# Patient Record
Sex: MALE | Race: WHITE | NOT HISPANIC OR LATINO | URBAN - METROPOLITAN AREA
[De-identification: names, ages, dates, MRNs, and addresses within clinical notes are randomized per-mention and may not be internally consistent; named-entity substitution may affect disease eponyms.]

---

## 2017-06-10 ENCOUNTER — EMERGENCY (EMERGENCY)
Facility: HOSPITAL | Age: 47
LOS: 1 days | Discharge: HOME | End: 2017-06-10
Attending: INTERNAL MEDICINE | Admitting: INTERNAL MEDICINE
Payer: COMMERCIAL

## 2017-06-10 VITALS
OXYGEN SATURATION: 100 % | WEIGHT: 184.97 LBS | RESPIRATION RATE: 16 BRPM | TEMPERATURE: 98 F | HEIGHT: 67 IN | HEART RATE: 106 BPM | DIASTOLIC BLOOD PRESSURE: 70 MMHG | SYSTOLIC BLOOD PRESSURE: 112 MMHG

## 2017-06-10 VITALS
DIASTOLIC BLOOD PRESSURE: 58 MMHG | HEART RATE: 77 BPM | OXYGEN SATURATION: 97 % | SYSTOLIC BLOOD PRESSURE: 117 MMHG | RESPIRATION RATE: 15 BRPM | TEMPERATURE: 98 F

## 2017-06-10 DIAGNOSIS — S61.422A LACERATION WITH FOREIGN BODY OF LEFT HAND, INITIAL ENCOUNTER: ICD-10-CM
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
## 2017-06-10 DIAGNOSIS — I10 ESSENTIAL (PRIMARY) HYPERTENSION: ICD-10-CM
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
## 2017-06-10 PROCEDURE — 90715 TDAP VACCINE 7 YRS/> IM: CPT

## 2017-06-10 PROCEDURE — 10120 INC&RMVL FB SUBQ TISS SMPL: CPT

## 2017-06-10 PROCEDURE — 90471 IMMUNIZATION ADMIN: CPT

## 2017-06-10 PROCEDURE — 12001 RPR S/N/AX/GEN/TRNK 2.5CM/<: CPT | Mod: 59

## 2017-06-10 PROCEDURE — 99285 EMERGENCY DEPT VISIT HI MDM: CPT | Mod: 25

## 2017-06-10 PROCEDURE — 73120 X-RAY EXAM OF HAND: CPT

## 2017-06-10 PROCEDURE — 99283 EMERGENCY DEPT VISIT LOW MDM: CPT | Mod: 25

## 2017-06-10 PROCEDURE — 73120 X-RAY EXAM OF HAND: CPT | Mod: 26,LT

## 2017-06-10 RX ORDER — TETANUS TOXOID, REDUCED DIPHTHERIA TOXOID AND ACELLULAR PERTUSSIS VACCINE, ADSORBED 5; 2.5; 8; 8; 2.5 [IU]/.5ML; [IU]/.5ML; UG/.5ML; UG/.5ML; UG/.5ML
0.5 SUSPENSION INTRAMUSCULAR ONCE
Qty: 0 | Refills: 0 | Status: COMPLETED | OUTPATIENT
Start: 2017-06-10 | End: 2017-06-10

## 2017-06-10 RX ADMIN — TETANUS TOXOID, REDUCED DIPHTHERIA TOXOID AND ACELLULAR PERTUSSIS VACCINE, ADSORBED 0.5 MILLILITER(S): 5; 2.5; 8; 8; 2.5 SUSPENSION INTRAMUSCULAR at 01:35

## 2017-06-10 RX ADMIN — Medication 1 TABLET(S): at 01:35

## 2017-06-10 NOTE — ED PROVIDER NOTE - NS ED ROS FT
Left palmar hand:  embedded splinter in the palm below his 5th finger that enters the base of the fourth finger, NVE intact. Wood is visible at the 3-4th space and palpable below the 5th finger

## 2017-06-10 NOTE — ED PROCEDURE NOTE - GENERAL PROCEDURE DETAILS
local anesthesia, small incision directly over the FB, removal with forceps, wound closed with 5-0 nylon x 4 stitches

## 2019-12-27 PROBLEM — I10 ESSENTIAL (PRIMARY) HYPERTENSION: Chronic | Status: ACTIVE | Noted: 2017-06-10

## 2019-12-30 PROBLEM — Z00.00 ENCOUNTER FOR PREVENTIVE HEALTH EXAMINATION: Status: ACTIVE | Noted: 2019-12-30

## 2020-01-02 ENCOUNTER — APPOINTMENT (OUTPATIENT)
Dept: COLORECTAL SURGERY | Facility: CLINIC | Age: 50
End: 2020-01-02

## 2020-01-07 ENCOUNTER — APPOINTMENT (OUTPATIENT)
Dept: COLORECTAL SURGERY | Facility: CLINIC | Age: 50
End: 2020-01-07
Payer: COMMERCIAL

## 2020-01-07 VITALS
DIASTOLIC BLOOD PRESSURE: 78 MMHG | TEMPERATURE: 98.8 F | BODY MASS INDEX: 32.39 KG/M2 | WEIGHT: 165 LBS | HEIGHT: 60 IN | HEART RATE: 69 BPM | RESPIRATION RATE: 16 BRPM | SYSTOLIC BLOOD PRESSURE: 110 MMHG

## 2020-01-07 DIAGNOSIS — Z86.79 PERSONAL HISTORY OF OTHER DISEASES OF THE CIRCULATORY SYSTEM: ICD-10-CM

## 2020-01-07 DIAGNOSIS — F17.200 NICOTINE DEPENDENCE, UNSPECIFIED, UNCOMPLICATED: ICD-10-CM

## 2020-01-07 DIAGNOSIS — K50.913 CROHN'S DISEASE, UNSPECIFIED, WITH FISTULA: ICD-10-CM

## 2020-01-07 DIAGNOSIS — K50.90 CROHN'S DISEASE, UNSPECIFIED, W/OUT COMPLICATIONS: ICD-10-CM

## 2020-01-07 PROCEDURE — 99213 OFFICE O/P EST LOW 20 MIN: CPT

## 2020-01-07 RX ORDER — METRONIDAZOLE 500 MG/1
TABLET, FILM COATED ORAL
Refills: 0 | Status: ACTIVE | COMMUNITY

## 2020-01-07 RX ORDER — MESALAMINE 1.2 G/1
TABLET, DELAYED RELEASE ORAL
Refills: 0 | Status: ACTIVE | COMMUNITY

## 2020-01-07 RX ORDER — LEVOFLOXACIN 750 MG/1
TABLET, FILM COATED ORAL
Refills: 0 | Status: ACTIVE | COMMUNITY

## 2020-03-10 ENCOUNTER — APPOINTMENT (OUTPATIENT)
Dept: COLORECTAL SURGERY | Facility: CLINIC | Age: 50
End: 2020-03-10
Payer: COMMERCIAL

## 2020-03-10 VITALS
WEIGHT: 164 LBS | HEIGHT: 60 IN | SYSTOLIC BLOOD PRESSURE: 120 MMHG | BODY MASS INDEX: 32.2 KG/M2 | DIASTOLIC BLOOD PRESSURE: 70 MMHG | TEMPERATURE: 98 F

## 2020-03-10 PROCEDURE — 99214 OFFICE O/P EST MOD 30 MIN: CPT

## 2020-03-17 PROBLEM — K50.913 CROHN'S DISEASE WITH FISTULA, UNSPECIFIED GASTROINTESTINAL TRACT LOCATION: Status: ACTIVE | Noted: 2020-03-17

## 2020-03-17 PROBLEM — K50.90 CROHN'S DISEASE: Status: ACTIVE | Noted: 2020-01-07

## 2020-03-17 NOTE — PHYSICAL EXAM
[Fistula] : a fistula [Tender, Swollen] : tender, swollen [Skin Tags] : residual hemorrhoidal skin tags were noted [Respiratory Effort] : normal respiratory effort [Normal Rate and Rhythm] : normal rate and rhythm [No Rash or Lesion] : No rash or lesion [Alert] : alert [Oriented to Person] : oriented to person [Oriented to Place] : oriented to place [Calm] : calm [Abdomen Masses] : No abdominal masses [Tender] : nontender [JVD] : no jugular venous distention  [de-identified] : 3 lateral left and right facial straps with scant amount of output. [de-identified] : NAD [de-identified] :  normocephalic, atraumatic.

## 2020-03-17 NOTE — HISTORY OF PRESENT ILLNESS
[FreeTextEntry1] : 50-year-old male with Crohn's disease presented for followup. He's doing well today he recently started biologic. She denies any anorectal pain or drainage. He has chronic tags and fistula surround his anal opening. He denies any abdominal pain today he is tolerating regular diet. His next colonoscopy is scheduled in the summer.

## 2020-03-17 NOTE — ASSESSMENT
[FreeTextEntry1] : 50-year-old male presented for followup after initiating biologic for his Crohn's disease. He has no symptoms from his perianal fistulas. I recommend he continue to this regimen and no surgical intervention will be necessary if he continues to have no symptoms.

## 2020-03-17 NOTE — PHYSICAL EXAM
[Abdomen Masses] : No abdominal masses [Tender] : nontender [JVD] : no jugular venous distention  [Respiratory Effort] : normal respiratory effort [Normal Rate and Rhythm] : normal rate and rhythm [No Rash or Lesion] : No rash or lesion [Alert] : alert [Oriented to Person] : oriented to person [Oriented to Place] : oriented to place [Calm] : calm [de-identified] : 3 lateral perianal fistulas with no abscess, large skin tags [de-identified] : NAD [de-identified] :  normocephalic, atraumatic.

## 2020-03-17 NOTE — HISTORY OF PRESENT ILLNESS
[FreeTextEntry1] : 50-year-old male with Crohn's disease presented Consultation for perianal fistulas.He has chronically draining perianal fistulas. He denies any pain, fever or changes in bowel habits. His last colonoscopy demonstrated active small bowel disease. He is not currently taking a biologic but was told that he will be starting seen by his gastroenterologist.

## 2020-03-17 NOTE — ASSESSMENT
[FreeTextEntry1] : 50-year-old male presents for consultation for perianal Crohn's disease. He will be starting biologic soon and his symptoms are not severe. I recommend that he starts biologic and returned for reevaluation. If his symptoms continue and he develops further drainage and pelvic sepsis, he will need seton placements.
